# Patient Record
Sex: MALE | Race: OTHER | HISPANIC OR LATINO
[De-identification: names, ages, dates, MRNs, and addresses within clinical notes are randomized per-mention and may not be internally consistent; named-entity substitution may affect disease eponyms.]

---

## 2021-09-02 PROBLEM — Z00.00 ENCOUNTER FOR PREVENTIVE HEALTH EXAMINATION: Status: ACTIVE | Noted: 2021-09-02

## 2021-10-26 ENCOUNTER — APPOINTMENT (OUTPATIENT)
Dept: NEUROLOGY | Facility: CLINIC | Age: 41
End: 2021-10-26
Payer: COMMERCIAL

## 2021-10-26 ENCOUNTER — NON-APPOINTMENT (OUTPATIENT)
Age: 41
End: 2021-10-26

## 2021-10-26 VITALS
HEART RATE: 60 BPM | SYSTOLIC BLOOD PRESSURE: 122 MMHG | DIASTOLIC BLOOD PRESSURE: 71 MMHG | HEIGHT: 61 IN | TEMPERATURE: 97.5 F

## 2021-10-26 PROCEDURE — 99072 ADDL SUPL MATRL&STAF TM PHE: CPT

## 2021-10-26 PROCEDURE — 99203 OFFICE O/P NEW LOW 30 MIN: CPT

## 2021-11-01 NOTE — CONSULT LETTER
[Dear  ___] : Dear  [unfilled], [Consult Letter:] : I had the pleasure of evaluating your patient, [unfilled]. [Please see my note below.] : Please see my note below. [Consult Closing:] : Thank you very much for allowing me to participate in the care of this patient.  If you have any questions, please do not hesitate to contact me. [Sincerely,] : Sincerely, [FreeTextEntry3] : Ina Osullivan M.D.\par Karma Lowe N.P.\par
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
## 2021-11-01 NOTE — PHYSICAL EXAM
[FreeTextEntry1] : Physical examination \par General: No acute distress, Awake, Alert.   \par Fundus: disc margins sharp.   \par \par   \par \par Mental status \par Awake, alert, and oriented to person, time and place, Normal attention span and concentration, Recent and remote memory intact, Language intact, Fund of knowledge intact.   \par Cranial Nerves \par II: VFF  \par III, IV, VI: PERRL, EOMI.   \par V: Facial sensation is normal B/L.   \par VII: Facial strength is normal B/L. \par \par \par VIII: Gross hearing is intact.    \par \par IX, X: Palate is midline and elevates symmetrically.   \par XI: Trapezius normal strength.   \par XII: Tongue midline without atrophy or fasciculations. \par \par Motor exam  \par Muscle tone - no evidence of rigidity or resistance in all 4 extremities.  \par No atrophy or fasciculations \par Muscle Strength: arms and legs, proximal and distal flexors and extensors are normal \par \par No UE drift.\par \par Reflexes \par  Diffuse hyperreflexia.  \par \par Plantars right: mute.   \par Plantars left: mute.   \par  \par \par Coordination \par Finger to nose: Normal.  \par Heel to shin: Normal.    \par \par Sensory \par Intact sensation to vibration and cold.\par Decreased PP pinky B. \par \par Gait \par Normal including heels, toes, and tandem gait.  \par  \par \par

## 2021-11-01 NOTE — REASON FOR VISIT
[Consultation] : a consultation visit [FreeTextEntry1] : headaches, paresthesias radiating down both arms.

## 2021-11-01 NOTE — ASSESSMENT
[FreeTextEntry1] : Tony Chaney is a 41 year old man with multiple issues.\par \par Possible cervical Radiculopathy - sensory loss in 5th digit, B is consistent with a C8 distribution- EMG arms. \par MRI cervical spine. \par Physical therapy.\par \par New onset headaches- MRI brain to rule out any structural lesions.\par No medications for pain at this time as the pain lasts 5 minutes at a time. \par \par Follow up one week after EMG.\par \par I discussed in detail with the patient the diagnosis, prognosis, treatment plan and answered all of his questions.\par \par

## 2021-11-17 ENCOUNTER — APPOINTMENT (OUTPATIENT)
Dept: NEUROLOGY | Facility: CLINIC | Age: 41
End: 2021-11-17
Payer: COMMERCIAL

## 2021-11-17 PROCEDURE — 95913 NRV CNDJ TEST 13/> STUDIES: CPT

## 2021-11-17 PROCEDURE — 95886 MUSC TEST DONE W/N TEST COMP: CPT

## 2021-11-17 PROCEDURE — 99072 ADDL SUPL MATRL&STAF TM PHE: CPT

## 2021-12-23 ENCOUNTER — APPOINTMENT (OUTPATIENT)
Dept: NEUROLOGY | Facility: CLINIC | Age: 41
End: 2021-12-23

## 2021-12-30 ENCOUNTER — APPOINTMENT (OUTPATIENT)
Dept: NEUROLOGY | Facility: CLINIC | Age: 41
End: 2021-12-30

## 2022-01-19 ENCOUNTER — APPOINTMENT (OUTPATIENT)
Dept: NEUROLOGY | Facility: CLINIC | Age: 42
End: 2022-01-19
Payer: COMMERCIAL

## 2022-01-19 DIAGNOSIS — H53.8 OTHER VISUAL DISTURBANCES: ICD-10-CM

## 2022-01-19 DIAGNOSIS — G56.03 CARPAL TUNNEL SYNDROM,BILATERAL UPPER LIMBS: ICD-10-CM

## 2022-01-19 DIAGNOSIS — M54.12 RADICULOPATHY, CERVICAL REGION: ICD-10-CM

## 2022-01-19 DIAGNOSIS — M54.2 CERVICALGIA: ICD-10-CM

## 2022-01-19 DIAGNOSIS — R51.9 HEADACHE, UNSPECIFIED: ICD-10-CM

## 2022-01-19 PROCEDURE — 99213 OFFICE O/P EST LOW 20 MIN: CPT | Mod: 95

## 2022-01-19 NOTE — CONSULT LETTER
[Dear  ___] : Dear  [unfilled], [Consult Letter:] : I had the pleasure of evaluating your patient, [unfilled]. [Please see my note below.] : Please see my note below. [Consult Closing:] : Thank you very much for allowing me to participate in the care of this patient.  If you have any questions, please do not hesitate to contact me. [Sincerely,] : Sincerely, [FreeTextEntry3] :  Karma Lowe NTejasP.\par

## 2022-01-19 NOTE — ASSESSMENT
[FreeTextEntry1] : Tony Chaney is a 41 year old man with multiple issues.\par \par Possible cervical Radiculopathy - sensory loss in 5th digit, B is consistent with a C8 distribution\par Recommend MRI cervical spine after seeing pain management-previously denied by insurance when ordered at last visit. \par Pain management referral. \par Physical therapy.\par \par Right eye blurred vision and headaches- MRI brain to rule out any structural lesions.\par Opthalmology referral. \par No medications for pain at this time as the pain lasts 5 minutes at a time. \par \par Neck pain- apply heat 20 minutes three times per day.\par Trial of Salon pas as needed or diclofenac gel.\par \par Bilateral carpal tunnel syndrome- Ortho hand surgery referral.\par OT referral.\par \par Follow up in office in 3-4 weeks. \par \par I discussed in detail with the patient the diagnosis, prognosis, treatment plan and answered all of his questions.\par \par

## 2022-01-19 NOTE — DATA REVIEWED
[de-identified] : 11/17/21 EMG arms\par There is electrophysiologic evidence of carpal tunnel syndrome, moderate on the left and mild on the right. There is no electrophysiologic evidence of a cervical radiculopathy involving the motor axons, bilaterally.
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
## 2022-01-19 NOTE — REASON FOR VISIT
[Home] : at home, [unfilled] , at the time of the visit. [Medical Office: ([facility])___] : at the medical office located in  [Verbal consent obtained from patient] : the patient, [unfilled] [FreeTextEntry1] : EMG results.

## 2022-01-19 NOTE — PHYSICAL EXAM
[FreeTextEntry1] : Exam limited due to telehealth. \par Physical examination \par General: No acute distress, Awake, Alert.   \par  \par Mental status \par Awake, alert, and oriented to person, time and place, Normal attention span and concentration, Recent and remote memory intact, Language intact, Fund of knowledge intact.   \par Cranial Nerves \par III, IV, VI: EOMI.   \par V: Facial sensation is normal B/L.   \par VII: Facial strength is normal B/L. \par \par \par VIII: Gross hearing is intact.    \par \par IX, X: Palate is midline and elevates symmetrically.   \par XI: Trapezius normal strength.   \par XII: Tongue midline without atrophy or fasciculations. \par \par Motor exam  \par   Muscle Strength: moving all four extremities without difficulty. \par \par No UE drift.\par  \par \par Coordination \par Finger to nose: Normal.  \par  \par \par Sensory  \par Decreased PP pinky B. \par \par Gait \par Normal gait\par  \par 10/26/21\par Physical examination \par General: No acute distress, Awake, Alert.   \par Fundus: disc margins sharp.   \par \par   \par \par Mental status \par Awake, alert, and oriented to person, time and place, Normal attention span and concentration, Recent and remote memory intact, Language intact, Fund of knowledge intact.   \par Cranial Nerves \par II: VFF  \par III, IV, VI: PERRL, EOMI.   \par V: Facial sensation is normal B/L.   \par VII: Facial strength is normal B/L. \par \par \par VIII: Gross hearing is intact.    \par \par IX, X: Palate is midline and elevates symmetrically.   \par XI: Trapezius normal strength.   \par XII: Tongue midline without atrophy or fasciculations. \par \par Motor exam  \par Muscle tone - no evidence of rigidity or resistance in all 4 extremities.  \par No atrophy or fasciculations \par Muscle Strength: arms and legs, proximal and distal flexors and extensors are normal \par \par No UE drift.\par \par Reflexes \par  Diffuse hyperreflexia.  \par \par Plantars right: mute.   \par Plantars left: mute.   \par  \par \par Coordination \par Finger to nose: Normal.  \par Heel to shin: Normal.    \par \par Sensory \par Intact sensation to vibration and cold.\par Decreased PP pinky B. \par \par Gait \par Normal including heels, toes, and tandem gait.  \par  \par \par

## 2024-03-26 NOTE — END OF VISIT
Care coordination note - CM [Time Spent: ___ minutes] : I have spent [unfilled] minutes of time on the encounter.

## 2025-08-05 ENCOUNTER — APPOINTMENT (OUTPATIENT)
Dept: SURGERY | Facility: CLINIC | Age: 45
End: 2025-08-05
Payer: COMMERCIAL

## 2025-08-05 VITALS
DIASTOLIC BLOOD PRESSURE: 71 MMHG | HEIGHT: 61 IN | OXYGEN SATURATION: 96 % | SYSTOLIC BLOOD PRESSURE: 106 MMHG | HEART RATE: 58 BPM | BODY MASS INDEX: 30.78 KG/M2 | WEIGHT: 163 LBS

## 2025-08-05 DIAGNOSIS — Z78.9 OTHER SPECIFIED HEALTH STATUS: ICD-10-CM

## 2025-08-05 DIAGNOSIS — D17.22 BENIGN LIPOMATOUS NEOPLASM OF SKIN AND SUBCUTANEOUS TISSUE OF LEFT ARM: ICD-10-CM

## 2025-08-05 DIAGNOSIS — D17.0 BENIGN LIPOMATOUS NEOPLASM OF SKIN AND SUBCUTANEOUS TISSUE OF HEAD, FACE AND NECK: ICD-10-CM

## 2025-08-05 PROCEDURE — 99204 OFFICE O/P NEW MOD 45 MIN: CPT

## 2025-08-06 PROBLEM — D17.0 LIPOMA OF FOREHEAD: Status: ACTIVE | Noted: 2025-08-06

## 2025-08-06 PROBLEM — D17.22 BENIGN LIPOMATOUS NEOPLASM OF SKIN AND SUBCUTANEOUS TISSUE OF LEFT ARM: Status: ACTIVE | Noted: 2025-08-06

## 2025-08-06 PROBLEM — Z78.9 DOES NOT USE ILLICIT DRUGS: Status: ACTIVE | Noted: 2025-08-05

## 2025-08-29 ENCOUNTER — NON-APPOINTMENT (OUTPATIENT)
Age: 45
End: 2025-08-29

## 2025-09-12 ENCOUNTER — RESULT REVIEW (OUTPATIENT)
Age: 45
End: 2025-09-12

## 2025-09-12 ENCOUNTER — APPOINTMENT (OUTPATIENT)
Dept: SURGERY | Facility: HOSPITAL | Age: 45
End: 2025-09-12

## 2025-09-12 ENCOUNTER — TRANSCRIPTION ENCOUNTER (OUTPATIENT)
Age: 45
End: 2025-09-12